# Patient Record
Sex: MALE | Race: WHITE | URBAN - NONMETROPOLITAN AREA
[De-identification: names, ages, dates, MRNs, and addresses within clinical notes are randomized per-mention and may not be internally consistent; named-entity substitution may affect disease eponyms.]

---

## 2017-08-14 PROBLEM — H52.03: Noted: 2017-08-14

## 2017-08-14 PROBLEM — H52.223: Noted: 2017-08-14

## 2019-01-03 ENCOUNTER — IMPORTED ENCOUNTER (OUTPATIENT)
Dept: URBAN - NONMETROPOLITAN AREA CLINIC 1 | Facility: CLINIC | Age: 24
End: 2019-01-03

## 2019-01-03 PROCEDURE — S0621 ROUTINE OPHTHALMOLOGICAL EXA: HCPCS

## 2019-01-03 NOTE — PATIENT DISCUSSION
Compound Hyperopic Astigmatism OU -  discussed findings w/patient-  updated spectacle Rx issued update prn-  monitor yearly or prn; 's Notes: MR 1/3/19DFE 1/3/19

## 2020-01-09 NOTE — PATIENT DISCUSSION
The cataracts are creating some visual symptoms that are tolerable at this time. Explained condition of cataracts and symptoms of worsening and becoming more bothersome.

## 2022-04-10 ASSESSMENT — TONOMETRY
OS_IOP_MMHG: 16
OD_IOP_MMHG: 16

## 2022-04-10 ASSESSMENT — VISUAL ACUITY
OD_SC: 20/20
OU_CC: J1+
OU_SC: 20/20
OS_SC: 20/25